# Patient Record
Sex: FEMALE | Race: WHITE | Employment: PART TIME | URBAN - METROPOLITAN AREA
[De-identification: names, ages, dates, MRNs, and addresses within clinical notes are randomized per-mention and may not be internally consistent; named-entity substitution may affect disease eponyms.]

---

## 2021-09-21 ENCOUNTER — THERAPY VISIT (OUTPATIENT)
Dept: PHYSICAL THERAPY | Facility: CLINIC | Age: 71
End: 2021-09-21
Payer: COMMERCIAL

## 2021-09-21 DIAGNOSIS — M25.562 ACUTE PAIN OF LEFT KNEE: ICD-10-CM

## 2021-09-21 PROCEDURE — 97161 PT EVAL LOW COMPLEX 20 MIN: CPT | Mod: GP | Performed by: PHYSICAL THERAPIST

## 2021-09-21 PROCEDURE — 97112 NEUROMUSCULAR REEDUCATION: CPT | Mod: GP | Performed by: PHYSICAL THERAPIST

## 2021-09-21 PROCEDURE — 97110 THERAPEUTIC EXERCISES: CPT | Mod: GP | Performed by: PHYSICAL THERAPIST

## 2021-09-21 ASSESSMENT — ACTIVITIES OF DAILY LIVING (ADL)
GO DOWN STAIRS: NOT ANSWERED
KNEE_ACTIVITY_OF_DAILY_LIVING_SUM: 8
WEAKNESS: THE SYMPTOM AFFECTS MY ACTIVITY MODERATELY
RISE FROM A CHAIR: NOT ANSWERED
SIT WITH YOUR KNEE BENT: NOT ANSWERED
GO UP STAIRS: NOT ANSWERED
GIVING WAY, BUCKLING OR SHIFTING OF KNEE: THE SYMPTOM AFFECTS MY ACTIVITY SLIGHTLY
STIFFNESS: THE SYMPTOM AFFECTS MY ACTIVITY MODERATELY
SQUAT: NOT ANSWERED
HOW_WOULD_YOU_RATE_THE_OVERALL_FUNCTION_OF_YOUR_KNEE_DURING_YOUR_USUAL_DAILY_ACTIVITIES?: NOT ANSWERED
LIMPING: NOT ANSWERED
STAND: NOT ANSWERED
WALK: NOT ANSWERED
SWELLING: NOT ANSWERED
PAIN: THE SYMPTOM AFFECTS MY ACTIVITY MODERATELY
KNEEL ON THE FRONT OF YOUR KNEE: NOT ANSWERED

## 2021-09-21 NOTE — PROGRESS NOTES
Physical Therapy Initial Evaluation  Subjective:    Patient Health History  Veronika Silva being seen for L knee pain.     Problem began: 6/29/2021 (MD appointment).   Problem occurred: Fell on knee Feb 2021   Pain is reported as 5/10 on pain scale.  General health as reported by patient is good.  Pertinent medical history includes: depression, heart problems, high blood pressure and overweight.   Red flags:  None as reported by patient.  Medical allergies: other. Other medical allergies details: Penicillin.   Surgeries include:  None.    Current medications:  Anti-depressants and high blood pressure medication.    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                  Therapist Generated HPI Evaluation  Problem details: Pt presents to clinic with complaints of L knee pain starting in February 2021 after tripping on sidewalk and landing on both knees. Pt has continued to have increased pain with stairs, squatting and walking. Pt has noticed more pain at night. Pt had MD appointment on 6/29/2021 and referred to PT. .         Type of problem:  Left knee.    This is a new condition.  Condition occurred with:  A fall/slip.  Where condition occurred: for unknown reasons.  Patient reports pain:  Medial.  Pain is described as aching and is intermittent.  Pain radiates to:  Knee and lower leg. Pain is the same all the time (activity dependent).  Since onset symptoms are unchanged.  Associated symptoms:  Buckling/giving out, loss of motion/stiffness, loss of strength and edema. Symptoms are exacerbated by bending/squatting, descending stairs, certain positions, ascending stairs, weight bearing, walking, standing and kneeling  and relieved by bracing/immobilizing, rest and ice.  Special tests included:  X-ray.  Past treatment: none. There was none improvement following previous treatment.  Restrictions due to condition include:  Working in normal job without restrictions.  Barriers  include:  None as reported by patient.                        Objective:        Flexibility/Screens:       Lower Extremity:  Decreased left lower extremity flexibility:Hamstrings and Gastroc                                                        Knee Evaluation:  ROM:  Strength wnl knee: glute med 4/5 bilaterally, glute max 4-/5 L, 4+/5 R.  AROM      Extension:  Left: lacking 10    Right:  Lacking 2  Flexion: Left: 130    Right: 130        Strength:         Quad Set Left: Good    Pain:   Quad Set Right: WNL    Pain:  Ligament Testing:  Not Assessed                Special Tests:   Left knee positive for the following special tests:  Meniscal    Palpation:    Left knee tenderness present at:  Medial Joint Line  Left knee tenderness not present at:  Lateral Joint Line and Patellar Tendon    Edema:    Circumference:      Joint Line:  Left:  40 cm   Right:  38 cm      Functional Testing:          Quad:    Single Leg Squat:  Left:      Right:        Bilateral Leg Squat:   Mild loss of control and excessive anterior knee excursion      Proprioception:   [employer] Balance Test:  Left:  EO x 5 sec  Right:  EO x 10 sec  % of Uninvolved:           General     ROS    Assessment/Plan:    Patient is a 70 year old female with left side knee complaints.    Patient has the following significant findings with corresponding treatment plan.                Diagnosis 1:  L knee pain, OA  Pain -  hot/cold therapy, manual therapy, self management, education and home program  Decreased ROM/flexibility - manual therapy, therapeutic exercise, therapeutic activity and home program  Decreased strength - therapeutic exercise and therapeutic activities  Impaired balance - neuro re-education and therapeutic activities  Impaired muscle performance - neuro re-education and home program  Decreased function - therapeutic activities and home program    Therapy Evaluation Codes:   1) History comprised of:   Personal factors that impact the plan of care:       None.    Comorbidity factors that impact the plan of care are:      Depression, High blood pressure and Overweight.     Medications impacting care: Anti-depressant and High blood pressure.  2) Examination of Body Systems comprised of:   Body structures and functions that impact the plan of care:      Knee.   Activity limitations that impact the plan of care are:      Bending, Sitting, Squatting/kneeling, Stairs and Standing.  3) Clinical presentation characteristics are:   Stable/Uncomplicated.  4) Decision-Making    Low complexity using standardized patient assessment instrument and/or measureable assessment of functional outcome.  Cumulative Therapy Evaluation is: Low complexity.    Previous and current functional limitations:  (See Goal Flow Sheet for this information)    Short term and Long term goals: (See Goal Flow Sheet for this information)     Communication ability:  Patient appears to be able to clearly communicate and understand verbal and written communication and follow directions correctly.  Treatment Explanation - The following has been discussed with the patient:   RX ordered/plan of care  Anticipated outcomes  Possible risks and side effects  This patient would benefit from PT intervention to resume normal activities.   Rehab potential is good.    Frequency:  1 X week, once daily  Duration:  for 6 weeks  Discharge Plan:  Achieve all LTG.  Independent in home treatment program.  Return to previous functional level by discharge.  Reach maximal therapeutic benefit.    Please refer to the daily flowsheet for treatment today, total treatment time and time spent performing 1:1 timed codes.

## 2021-09-21 NOTE — LETTER
[facility]  [id number] [address] [place] [id number]  [phone]    2021    Re: Veronika Silva   :   1950  MRN:  [id number]   REFERRING PHYSICIAN:   Vince HENRIQUEZ [facility]    Date of Initial Evaluation:  2021  Visits:  Rxs Used: 1  Reason for Referral:  Acute pain of left knee    EVALUATION SUMMARY    Physical Therapy Initial Evaluation  Subjective:  Patient Health History  Veronika Silva being seen for L knee pain.   Problem began: 2021 (MD appointment).   Problem occurred: Fell on knee 2021   Pain is reported as 5/10 on pain scale.  General health as reported by patient is good.  Pertinent medical history includes: depression, heart problems, high blood pressure and overweight.   Red flags:  None as reported by patient.  Medical allergies: other. Other medical allergies details: Penicillin.   Surgeries include:  None.    Current medications:  Anti-depressants and high blood pressure medication.    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       Therapist Generated HPI Evaluation  Problem details: Pt presents to clinic with complaints of L knee pain starting in 2021 after tripping on sidewalk and landing on both knees. Pt has continued to have increased pain with stairs, squatting and walking. Pt has noticed more pain at night. Pt had MD appointment on 2021 and referred to PT. .         Type of problem:  Left knee.  This is a new condition.  Condition occurred with:  A fall/slip.  Where condition occurred: for unknown reasons.  Patient reports pain:  Medial.  Pain is described as aching and is intermittent.  Pain radiates to:  Knee and lower leg. Pain is the same all the time (activity dependent).  Since onset symptoms are unchanged.  Associated symptoms:  Buckling/giving out, loss of motion/stiffness, loss of strength and  edema. Symptoms are exacerbated by bending/squatting, descending stairs, certain positions, ascending stairs, weight bearing, walking, standing and kneeling  and relieved by bracing/immobilizing, rest and ice.  Special tests included:  X-ray.  Re: Veronika Silva   :   1950  Page 2    Past treatment: none. There was none improvement following previous treatment.  Restrictions due to condition include:  Working in normal job without restrictions.  Barriers include:  None as reported by patient.  Lower Extremity:  Decreased left lower extremity flexibility:Hamstrings and Gastroc       Knee Evaluation:  ROM:  Strength wnl knee: glute med 4/5 bilaterally, glute max 4-/5 L, 4+/5 R.  AROM   Extension:  Left: lacking 10    Right:  Lacking 2  Flexion: Left: 130    Right: 130  Strength:   Quad Set Left: Good    Pain:   Quad Set Right: WNL    Pain:  Ligament Testing:  Not Assessed  Special Tests:   Left knee positive for the following special tests:  Meniscal    Palpation:    Left knee tenderness present at:  Medial Joint Line  Left knee tenderness not present at:  Lateral Joint Line and Patellar Tendon    Edema:    Circumference:  Joint Line:  Left:  40 cm   Right:  38 cm    Functional Testing:    Quad:    Single Leg Squat:  Left:      Right:        Bilateral Leg Squat:   Mild loss of control and excessive anterior knee excursion      Proprioception:   [employer] Balance Test:  Left:  EO x 5 sec  Right:  EO x 10 sec  % of Uninvolved:   Assessment/Plan:    Patient is a 70 year old female with left side knee complaints.    Patient has the following significant findings with corresponding treatment plan.                Diagnosis 1:  L knee pain, OA  Pain -  hot/cold therapy, manual therapy, self management, education and home program  Decreased ROM/flexibility - manual therapy, therapeutic exercise, therapeutic activity and home program  Decreased strength - therapeutic exercise and therapeutic activities  Impaired balance -  neuro re-education and therapeutic activities  Impaired muscle performance - neuro re-education and home program  Decreased function - therapeutic activities and home program    Therapy Evaluation Codes:   1) History comprised of:   Personal factors that impact the plan of care:      None.   Re: Veronika Silva   :   1950  Page 3       Comorbidity factors that impact the plan of care are:      Depression, High blood pressure and Overweight.     Medications impacting care: Anti-depressant and High blood pressure.  2) Examination of Body Systems comprised of:   Body structures and functions that impact the plan of care:      Knee.   Activity limitations that impact the plan of care are:      Bending, Sitting, Squatting/kneeling, Stairs and Standing.  3) Clinical presentation characteristics are:   Stable/Uncomplicated.  4) Decision-Making    Low complexity using standardized patient assessment instrument and/or measureable assessment of functional outcome.  Cumulative Therapy Evaluation is: Low complexity.    Previous and current functional limitations:  (See Goal Flow Sheet for this information)    Short term and Long term goals: (See Goal Flow Sheet for this information)     Communication ability:  Patient appears to be able to clearly communicate and understand verbal and written communication and follow directions correctly.  Treatment Explanation - The following has been discussed with the patient:   RX ordered/plan of care  Anticipated outcomes  Possible risks and side effects  This patient would benefit from PT intervention to resume normal activities.   Rehab potential is good.  Frequency:  1 X week, once daily  Duration:  for 6 weeks  Discharge Plan:  Achieve all LTG.  Independent in home treatment program.  Return to previous functional level by discharge.  Reach maximal therapeutic benefit.  .     Thank you for your referral.    INQUIRIES  Therapist: Jeffery Green DPT  [facility]  SERVICES [address] [address]  Phone: [phone]  Fax: [phone]

## 2021-09-28 ENCOUNTER — THERAPY VISIT (OUTPATIENT)
Dept: PHYSICAL THERAPY | Facility: CLINIC | Age: 71
End: 2021-09-28
Payer: COMMERCIAL

## 2021-09-28 DIAGNOSIS — M25.562 ACUTE PAIN OF LEFT KNEE: ICD-10-CM

## 2021-09-28 PROCEDURE — 97140 MANUAL THERAPY 1/> REGIONS: CPT | Mod: GP | Performed by: PHYSICAL THERAPIST

## 2021-09-28 PROCEDURE — 97112 NEUROMUSCULAR REEDUCATION: CPT | Mod: GP | Performed by: PHYSICAL THERAPIST

## 2021-09-28 PROCEDURE — 97110 THERAPEUTIC EXERCISES: CPT | Mod: GP | Performed by: PHYSICAL THERAPIST

## 2021-10-04 ENCOUNTER — THERAPY VISIT (OUTPATIENT)
Dept: PHYSICAL THERAPY | Facility: CLINIC | Age: 71
End: 2021-10-04
Payer: COMMERCIAL

## 2021-10-04 DIAGNOSIS — M25.562 ACUTE PAIN OF LEFT KNEE: ICD-10-CM

## 2021-10-04 PROCEDURE — 97110 THERAPEUTIC EXERCISES: CPT | Mod: GP | Performed by: PHYSICAL THERAPIST

## 2021-10-04 PROCEDURE — 97140 MANUAL THERAPY 1/> REGIONS: CPT | Mod: GP | Performed by: PHYSICAL THERAPIST

## 2021-10-04 PROCEDURE — 97112 NEUROMUSCULAR REEDUCATION: CPT | Mod: GP | Performed by: PHYSICAL THERAPIST

## 2021-10-18 ENCOUNTER — THERAPY VISIT (OUTPATIENT)
Dept: PHYSICAL THERAPY | Facility: CLINIC | Age: 71
End: 2021-10-18
Payer: COMMERCIAL

## 2021-10-18 DIAGNOSIS — M25.562 ACUTE PAIN OF LEFT KNEE: ICD-10-CM

## 2021-10-18 PROCEDURE — 97112 NEUROMUSCULAR REEDUCATION: CPT | Mod: GP | Performed by: PHYSICAL THERAPIST

## 2021-10-18 PROCEDURE — 97110 THERAPEUTIC EXERCISES: CPT | Mod: GP | Performed by: PHYSICAL THERAPIST

## 2021-11-24 PROBLEM — M25.562 ACUTE PAIN OF LEFT KNEE: Status: RESOLVED | Noted: 2021-09-21 | Resolved: 2021-11-24

## 2021-11-24 NOTE — PROGRESS NOTES
Discharge Note    Progress reporting period is from initial evaluation date (please see noted date below) to Oct 18, 2021.  Linked Episodes   Type: Episode: Status: Noted: Resolved: Last update: Updated by:   PHYSICAL THERAPY L knee 9/21/2021 Active 9/21/2021  10/18/2021 11:46 AM Jeffery Green, PT      Comments:       Veronika failed to follow up and current status is unknown.  Please see information below for last relevant information on current status.  Patient seen for 4 visits.    SUBJECTIVE  Subjective changes noted by patient:  Veronika reports having increased pain and swelling in L knee after walking at [employer] yesterday. Frustrated with inconsistency of symptoms.   .  Current pain level is 4/10.     Previous pain level was  4/10.   Changes in function:  Yes (See Goal flowsheet attached for changes in current functional level)  Adverse reaction to treatment or activity: None    OBJECTIVE  Changes noted in objective findings: L knee ROM: 0-5-135, joint line circumference 39 cm L 38 cm R; EO SL balance x 20 sec R LE, x 10 sec L LE     ASSESSMENT/PLAN  Diagnosis: L knee pain, OA   Updated problem list and treatment plan:   Pain - HEP  Decreased ROM/flexibility - HEP  Decreased strength - HEP  Impaired muscle performance - HEP  Impaired gait - HEP  Impaired posture - HEP  STG/LTGs have been met or progress has been made towards goals:  Yes, please see goal flowsheet for most current information  Assessment of Progress: current status is unknown.    Last current status: Pt is progressing slower than anticipated   Self Management Plans:  HEP  I have re-evaluated this patient and find that the nature, scope, duration and intensity of the therapy is appropriate for the medical condition of the patient.  Veronika continues to require the following intervention to meet STG and LTG's:  HEP.    Recommendations:  Discharge with current home program.  Patient to follow up with MD as needed.    Please refer to the daily  flowsheet for treatment today, total treatment time and time spent performing 1:1 timed codes.